# Patient Record
Sex: FEMALE | Race: BLACK OR AFRICAN AMERICAN | NOT HISPANIC OR LATINO | URBAN - METROPOLITAN AREA
[De-identification: names, ages, dates, MRNs, and addresses within clinical notes are randomized per-mention and may not be internally consistent; named-entity substitution may affect disease eponyms.]

---
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
## 2023-04-03 ENCOUNTER — OFFICE VISIT (OUTPATIENT)
Dept: PRIMARY CARE CLINIC | Facility: CLINIC | Age: 18
End: 2023-04-03
Payer: COMMERCIAL

## 2023-04-03 VITALS
WEIGHT: 135 LBS | DIASTOLIC BLOOD PRESSURE: 78 MMHG | HEIGHT: 58 IN | SYSTOLIC BLOOD PRESSURE: 109 MMHG | BODY MASS INDEX: 28.34 KG/M2 | HEART RATE: 70 BPM | OXYGEN SATURATION: 100 %

## 2023-04-03 DIAGNOSIS — L30.9 ECZEMA, UNSPECIFIED TYPE: ICD-10-CM

## 2023-04-03 DIAGNOSIS — M21.70 LEG LENGTH DISCREPANCY: ICD-10-CM

## 2023-04-03 DIAGNOSIS — E66.3 OVERWEIGHT (BMI 25.0-29.9): ICD-10-CM

## 2023-04-03 DIAGNOSIS — F79 INTELLECTUAL DISABILITY: Primary | ICD-10-CM

## 2023-04-03 PROCEDURE — 3078F PR MOST RECENT DIASTOLIC BLOOD PRESSURE < 80 MM HG: ICD-10-PCS | Mod: CPTII,S$GLB,, | Performed by: INTERNAL MEDICINE

## 2023-04-03 PROCEDURE — 3008F PR BODY MASS INDEX (BMI) DOCUMENTED: ICD-10-PCS | Mod: CPTII,S$GLB,, | Performed by: INTERNAL MEDICINE

## 2023-04-03 PROCEDURE — 3074F PR MOST RECENT SYSTOLIC BLOOD PRESSURE < 130 MM HG: ICD-10-PCS | Mod: CPTII,S$GLB,, | Performed by: INTERNAL MEDICINE

## 2023-04-03 PROCEDURE — 99214 OFFICE O/P EST MOD 30 MIN: CPT | Mod: S$GLB,,, | Performed by: INTERNAL MEDICINE

## 2023-04-03 PROCEDURE — 3008F BODY MASS INDEX DOCD: CPT | Mod: CPTII,S$GLB,, | Performed by: INTERNAL MEDICINE

## 2023-04-03 PROCEDURE — 3078F DIAST BP <80 MM HG: CPT | Mod: CPTII,S$GLB,, | Performed by: INTERNAL MEDICINE

## 2023-04-03 PROCEDURE — 1159F PR MEDICATION LIST DOCUMENTED IN MEDICAL RECORD: ICD-10-PCS | Mod: CPTII,S$GLB,, | Performed by: INTERNAL MEDICINE

## 2023-04-03 PROCEDURE — 99214 PR OFFICE/OUTPT VISIT, EST, LEVL IV, 30-39 MIN: ICD-10-PCS | Mod: S$GLB,,, | Performed by: INTERNAL MEDICINE

## 2023-04-03 PROCEDURE — 3074F SYST BP LT 130 MM HG: CPT | Mod: CPTII,S$GLB,, | Performed by: INTERNAL MEDICINE

## 2023-04-03 PROCEDURE — 1159F MED LIST DOCD IN RCRD: CPT | Mod: CPTII,S$GLB,, | Performed by: INTERNAL MEDICINE

## 2023-04-03 NOTE — PROGRESS NOTES
Subjective:      Patient ID: Fidelina Gomez is a 17 y.o. female.    Chief Complaint: Follow-up and PAPERWORK    HPI    Past Medical History:   Diagnosis Date    Acquired unequal leg length     Premature baby           Patient here with her grandmother.  She is here for paperwork for disability       She is from [facility].   states she has moderate intellectual disability as she was premature. She also states she went to [facility] went last year and was told one of her legs was shorter than the other     She has some behavioral issues. GM states she sometimes doesn't want to come out of her room and it is difficult to motivate her.      states she is up to date on her vaccinations. She is able to do all her ADLs on her own    She is not sexually active      Review of Systems   Constitutional:  Negative for chills and fever.   Respiratory:  Negative for cough, shortness of breath and wheezing.    Cardiovascular:  Negative for chest pain, palpitations and leg swelling.   Gastrointestinal:  Negative for abdominal pain, constipation, diarrhea, nausea and vomiting.   Genitourinary:  Negative for dysuria, frequency and urgency.   Musculoskeletal:  Negative for falls and joint pain.   Skin:  Positive for rash.   Neurological:  Negative for dizziness and headaches.   Endo/Heme/Allergies:  Does not bruise/bleed easily.   Psychiatric/Behavioral:  Negative for substance abuse. The patient is not nervous/anxious.    Objective:     Physical Exam  Vitals reviewed.   Constitutional:       Appearance: Normal appearance.   HENT:      Head: Normocephalic.   Eyes:      Extraocular Movements: Extraocular movements intact.      Conjunctiva/sclera: Conjunctivae normal.      Pupils: Pupils are equal, round, and reactive to light.   Cardiovascular:      Rate and Rhythm: Normal rate and regular rhythm.   Pulmonary:      Effort: Pulmonary effort is normal.      Breath sounds: Normal breath sounds.   Abdominal:       "General: Bowel sounds are normal.   Musculoskeletal:      Cervical back: Normal range of motion.      Right lower leg: No edema.      Left lower leg: No edema.   Skin:     General: Skin is warm.      Capillary Refill: Capillary refill takes less than 2 seconds.      Findings: Rash present.   Neurological:      General: No focal deficit present.      Mental Status: She is alert and oriented to person, place, and time.   Psychiatric:         Mood and Affect: Mood normal.     /78 (BP Location: Left arm, Patient Position: Sitting, BP Method: Medium (Automatic))   Pulse 70   Ht 4' 10" (1.473 m)   Wt 61.2 kg (135 lb)   SpO2 100%   BMI 28.22 kg/m²     Assessment:       ICD-10-CM ICD-9-CM   1. Intellectual disability  F79 319   2. Leg length discrepancy  M21.70 736.81   3. Eczema, unspecified type  L30.9 692.9   4. Overweight (BMI 25.0-29.9)  E66.3 278.02       Plan:          1. Intellectual disability    2. Leg length discrepancy    3. Eczema, unspecified type    4. Overweight (BMI 25.0-29.9)         Counseled on diet and exercise    If not already done, consider hearing test and/or will need records     Dry facial skin and some eczema patches on arm and neck, recommended cetaphil  and other emollients       Future Appointments   Date Time Provider Department Center   4/3/2024  1:40 PM Leanna Velasco MD [facility] Renea Apodaca                "

## 2023-07-24 ENCOUNTER — OFFICE VISIT (OUTPATIENT)
Dept: PRIMARY CARE CLINIC | Facility: CLINIC | Age: 18
End: 2023-07-24
Payer: COMMERCIAL

## 2023-07-24 VITALS
OXYGEN SATURATION: 95 % | HEIGHT: 58 IN | DIASTOLIC BLOOD PRESSURE: 65 MMHG | WEIGHT: 135.19 LBS | HEART RATE: 65 BPM | SYSTOLIC BLOOD PRESSURE: 98 MMHG | BODY MASS INDEX: 28.38 KG/M2

## 2023-07-24 DIAGNOSIS — M21.70 LEG LENGTH DISCREPANCY: ICD-10-CM

## 2023-07-24 DIAGNOSIS — F79 INTELLECTUAL DISABILITY: Primary | ICD-10-CM
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
## 2023-07-24 DIAGNOSIS — R40.4 EPISODES OF STARING: ICD-10-CM

## 2023-07-24 PROCEDURE — 1159F PR MEDICATION LIST DOCUMENTED IN MEDICAL RECORD: ICD-10-PCS | Mod: CPTII,S$GLB,, | Performed by: INTERNAL MEDICINE

## 2023-07-24 PROCEDURE — 99214 OFFICE O/P EST MOD 30 MIN: CPT | Mod: S$GLB,,, | Performed by: INTERNAL MEDICINE

## 2023-07-24 PROCEDURE — 3078F PR MOST RECENT DIASTOLIC BLOOD PRESSURE < 80 MM HG: ICD-10-PCS | Mod: CPTII,S$GLB,, | Performed by: INTERNAL MEDICINE

## 2023-07-24 PROCEDURE — 3074F SYST BP LT 130 MM HG: CPT | Mod: CPTII,S$GLB,, | Performed by: INTERNAL MEDICINE

## 2023-07-24 PROCEDURE — 99214 PR OFFICE/OUTPT VISIT, EST, LEVL IV, 30-39 MIN: ICD-10-PCS | Mod: S$GLB,,, | Performed by: INTERNAL MEDICINE

## 2023-07-24 PROCEDURE — 3008F PR BODY MASS INDEX (BMI) DOCUMENTED: ICD-10-PCS | Mod: CPTII,S$GLB,, | Performed by: INTERNAL MEDICINE

## 2023-07-24 PROCEDURE — 3008F BODY MASS INDEX DOCD: CPT | Mod: CPTII,S$GLB,, | Performed by: INTERNAL MEDICINE

## 2023-07-24 PROCEDURE — 3078F DIAST BP <80 MM HG: CPT | Mod: CPTII,S$GLB,, | Performed by: INTERNAL MEDICINE

## 2023-07-24 PROCEDURE — 1159F MED LIST DOCD IN RCRD: CPT | Mod: CPTII,S$GLB,, | Performed by: INTERNAL MEDICINE

## 2023-07-24 PROCEDURE — 3074F PR MOST RECENT SYSTOLIC BLOOD PRESSURE < 130 MM HG: ICD-10-PCS | Mod: CPTII,S$GLB,, | Performed by: INTERNAL MEDICINE

## 2023-07-24 NOTE — PROGRESS NOTES
"Subjective:      Patient ID: Fidelina Gomez is a 17 y.o. female.    Chief Complaint: Fall ("She falls a lot. Before she falls, she stiffens up, then shakes and then falls." Mom is wondering if this is a "defense mechanism."  ) and paperwork    HPI    Past Medical History:   Diagnosis Date    Acquired unequal leg length     Premature baby        Patient has a h/o developmental delay, all her records are at [facility] regarding her DDH. She lives with [facility] who is her guardian. She reports difficulty with gait and is has been about a year since she had PT  She has completed high school. She is non verbal most of the time but is able to talk. She wears headphones to cancel out noise.    has paperwork for her disability which I filled out   She is having more falls   states she has episodes where she stares off into space   She is going to schedule a hearing test for her     Review of Systems   Constitutional:  Negative for chills and fever.   HENT:  Negative for hearing loss.    Eyes:  Negative for blurred vision.   Respiratory:  Negative for cough, shortness of breath and wheezing.    Cardiovascular:  Negative for chest pain, palpitations and leg swelling.   Gastrointestinal:  Negative for abdominal pain, blood in stool, constipation, diarrhea, melena, nausea and vomiting.   Genitourinary:  Negative for dysuria, frequency and urgency.   Musculoskeletal:  Positive for falls.   Skin:  Negative for rash.   Neurological:  Negative for dizziness and headaches.   Endo/Heme/Allergies:  Does not bruise/bleed easily.   Psychiatric/Behavioral:  Negative for depression. The patient is not nervous/anxious.    Objective:     Physical Exam  Vitals reviewed.   Constitutional:       Appearance: Normal appearance.   HENT:      Head: Normocephalic.      Mouth/Throat:      Mouth: Mucous membranes are moist.      Pharynx: Oropharynx is clear.   Eyes:      Extraocular Movements: Extraocular movements intact.      " "Conjunctiva/sclera: Conjunctivae normal.      Pupils: Pupils are equal, round, and reactive to light.   Cardiovascular:      Rate and Rhythm: Normal rate and regular rhythm.   Pulmonary:      Effort: Pulmonary effort is normal.      Breath sounds: Normal breath sounds.   Abdominal:      General: Bowel sounds are normal.   Musculoskeletal:      Right lower leg: No edema.      Left lower leg: No edema.   Skin:     General: Skin is warm.      Capillary Refill: Capillary refill takes less than 2 seconds.   Neurological:      Mental Status: She is alert.   Psychiatric:         Mood and Affect: Mood normal.     BP 98/65 (BP Location: Right arm, Patient Position: Sitting, BP Method: Medium (Automatic))   Pulse 65   Ht 4' 10" (1.473 m)   Wt 61.3 kg (135 lb 3.2 oz)   LMP 07/17/2023 (Exact Date)   SpO2 95%   BMI 28.26 kg/m²     Assessment:       ICD-10-CM ICD-9-CM   1. Episodes of staring  R40.4 780.02   2. Fall, sequela  W19.XXXS 909.4     E929.3   3. Leg length discrepancy  M21.70 736.81       Plan:          1. Episodes of staring  -     EEG; Future    2. Fall, sequela  -     Cancel: Ambulatory referral/consult to Neurology; Future; Expected date: 07/31/2023    3. Leg length discrepancy  -     Ambulatory referral/consult to Physical/Occupational Therapy; Future; Expected date: 07/31/2023    Other orders  -     Cancel: EEG; Future         Future Appointments   Date Time Provider Department Center   4/3/2024  1:40 PM Leanna Velasco MD [facility]                      "

## 2023-07-31 ENCOUNTER — TELEPHONE (OUTPATIENT)
Dept: PRIMARY CARE CLINIC | Facility: CLINIC | Age: 18
End: 2023-07-31
Payer: COMMERCIAL

## 2023-07-31 NOTE — TELEPHONE ENCOUNTER
----- Message from Nohemi Green MA sent at 7/31/2023 10:57 AM CDT -----  Contact: pt    ----- Message -----  From: Josephine López RN  Sent: 7/28/2023   3:29 PM CDT  To: Nohemi Green MA      ----- Message -----  From: Lisette Laytonborne  Sent: 7/28/2023   9:57 AM CDT  To: Rod Ram Staff    Pt grandmother Neris calling about referral for physical therapy does except pt ins and needs another referral sent to [facility] and there number [phone] and fax [phone].  She can be reached at [phone]. Pt lease Ms. Cordon once referral is sent.    Thanks,

## 2023-08-07 ENCOUNTER — TELEPHONE (OUTPATIENT)
Dept: PRIMARY CARE CLINIC | Facility: CLINIC | Age: 18
End: 2023-08-07
Payer: COMMERCIAL

## 2023-08-07 NOTE — TELEPHONE ENCOUNTER
Shadia he has an EEG in his chart. Do you remember where you sent his order to?       ----- Message from Lisette Martínez sent at 8/7/2023 11:22 AM CDT -----  Contact: pt  Pt grandmother Neris calling about referral to neurology and she can be reached at [phone]    Thanks,

## 2023-08-08 ENCOUNTER — TELEPHONE (OUTPATIENT)
Dept: PRIMARY CARE CLINIC | Facility: CLINIC | Age: 18
End: 2023-08-08
Payer: COMMERCIAL

## 2023-08-08 NOTE — TELEPHONE ENCOUNTER
The grandmother states she will not be able to follow the protocol for the EEG. She states the patient has to be up at 5 am then at the hospital for 8 am etc. She wants to know can this be done in patient.       Me     CL    8/7/23  2:10 PM  Note  Shadia he has an EEG in his chart. Do you remember where you sent his order to?         ----- Message from Lisette Martínez sent at 8/7/2023 11:22 AM CDT -----  Contact: pt  Pt grandmother Neris calling about referral to neurology and she can be reached at [phone]     Thanks,

## 2023-08-09 NOTE — TELEPHONE ENCOUNTER
----- Message from Giovanna Bryant sent at 8/9/2023 10:07 AM CDT -----  Contact: pt grandmother - Neris Taylor  Type:  Patient Requesting Referral    Who Called:pt   Does the patient already have the specialty appointment scheduled?:yes  If yes, what is the date of that appointment?: 08/17  Referral to What Specialty: phys therapy   Reason for Referral: pt's Leg  Does the patient want the referral with a specific physician?: [facility]   [facility] the [facility]   Patient Requesting a Response?: yes   Would the patient rather a call back or a response via [facility]? phone  Best Call Back Number: [phone]  Additional Information:  [facility] fax # [phone]/ phone: [phone]

## 2023-09-20 ENCOUNTER — TELEPHONE (OUTPATIENT)
Dept: PRIMARY CARE CLINIC | Facility: CLINIC | Age: 18
End: 2023-09-20
Payer: COMMERCIAL

## 2023-09-20 NOTE — TELEPHONE ENCOUNTER
Results have been scanned under media.     ----- Message from Giovanna Bryant sent at 9/20/2023  3:26 PM CDT -----  Contact: mari richey  Type:  Test Results    Who Called:  alejo   Name of Test (Lab/Mammo/Etc): EEG   Date of Test:  last Friday   Ordering Provider: Dr Velasco   Where the test was performed:  hospitals  Would the patient rather a call back or a response via [facility]?  phone  Best Call Back Number:  [phone]  Additional Information:

## 2023-09-22 DIAGNOSIS — F79 INTELLECTUAL DISABILITY: Primary | ICD-10-CM
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
## 2023-10-16 ENCOUNTER — TELEPHONE (OUTPATIENT)
Dept: NEUROLOGY | Facility: CLINIC | Age: 18
End: 2023-10-16
Payer: COMMERCIAL

## 2023-10-16 DIAGNOSIS — R56.9 SEIZURES: Primary | ICD-10-CM

## 2023-10-16 NOTE — TELEPHONE ENCOUNTER
Good Morning    A neurology referral was received and per our neurology provider review-patient needs EEG. Per appointment tab, EEG was ordered but never completed, possibly due to unable to contact patient.    Currently scheduling new neurology patients in June/July 2024-once patient has completed EEG, she can be placed on cancellation list.     Thank you

## 2023-10-16 NOTE — TELEPHONE ENCOUNTER
Thank you--I apologize for not checking the Media tab. She has been scheduled for 7/9/2024. We will place her on the cancellation list.    Thank you

## 2024-02-05 ENCOUNTER — TELEPHONE (OUTPATIENT)
Dept: PRIMARY CARE CLINIC | Facility: CLINIC | Age: 19
End: 2024-02-05
Payer: COMMERCIAL

## 2024-02-05 NOTE — TELEPHONE ENCOUNTER
Not sure if she needs an updated referral?    ----- Message from Caprice Murdock sent at 2/5/2024  9:35 AM CST -----  Regarding: Referral  Contact: patient  Per phone call with patient, she is requesting to be referred to [facility].  Please return call at [phone] (home).    Thanks,  SJ

## 2024-02-06 DIAGNOSIS — M21.70 LEG LENGTH DISCREPANCY: Primary | ICD-10-CM

## 2024-05-16 ENCOUNTER — TELEPHONE (OUTPATIENT)
Dept: PRIMARY CARE CLINIC | Facility: CLINIC | Age: 19
End: 2024-05-16
Payer: COMMERCIAL

## 2024-05-16 NOTE — TELEPHONE ENCOUNTER
----- Message from Leanna Cutler sent at 5/16/2024 10:02 AM CDT -----  Name of Who is Calling:Jose Lew           What is the request in detail:calling to get updated 90L form . Requested same day care as form is a month overdue.       Fax#[phone]    Can the clinic reply by [facility]:no           [facility] Back [facility]: [phone]

## 2024-05-21 ENCOUNTER — TELEPHONE (OUTPATIENT)
Dept: PRIMARY CARE CLINIC | Facility: CLINIC | Age: 19
End: 2024-05-21
Payer: COMMERCIAL

## 2024-05-21 NOTE — TELEPHONE ENCOUNTER
The patient cancelled her appointment on 04/2024. Please call her one last time, to let her know she needs to reschedule so these people can have the formed filled out by Dr Velasco. Thank you.     ----- Message from Elodia Arzate sent at 5/21/2024 11:48 AM CDT -----   [address] is calling in regards to still waiting on patient 90L for please fax to [phone].